# Patient Record
Sex: MALE | Race: WHITE | NOT HISPANIC OR LATINO | URBAN - METROPOLITAN AREA
[De-identification: names, ages, dates, MRNs, and addresses within clinical notes are randomized per-mention and may not be internally consistent; named-entity substitution may affect disease eponyms.]

---

## 2018-01-29 ENCOUNTER — OFFICE (OUTPATIENT)
Dept: URBAN - METROPOLITAN AREA CLINIC 75 | Facility: CLINIC | Age: 58
End: 2018-01-29

## 2018-01-29 VITALS
HEART RATE: 69 BPM | HEIGHT: 70 IN | DIASTOLIC BLOOD PRESSURE: 64 MMHG | SYSTOLIC BLOOD PRESSURE: 118 MMHG | WEIGHT: 234 LBS

## 2018-01-29 DIAGNOSIS — E11.9 TYPE 2 DIABETES MELLITUS WITHOUT COMPLICATIONS: ICD-10-CM

## 2018-01-29 DIAGNOSIS — K57.92 DIVERTICULITIS OF INTESTINE, PART UNSPECIFIED, WITHOUT PERFO: ICD-10-CM

## 2018-01-29 DIAGNOSIS — Z86.010 PERSONAL HISTORY OF COLONIC POLYPS: ICD-10-CM

## 2018-01-29 DIAGNOSIS — R10.13 EPIGASTRIC PAIN: ICD-10-CM

## 2018-01-29 DIAGNOSIS — K85.90 ACUTE PANCREATITIS WITHOUT NECROSIS OR INFECTION, UNSPECIFIE: ICD-10-CM

## 2018-01-29 PROCEDURE — 99244 OFF/OP CNSLTJ NEW/EST MOD 40: CPT | Performed by: INTERNAL MEDICINE

## 2018-01-29 RX ORDER — PANTOPRAZOLE 40 MG/1
TABLET, DELAYED RELEASE ORAL
Qty: 30 | Refills: 3 | Status: ACTIVE

## 2018-02-06 ENCOUNTER — ON CAMPUS - OUTPATIENT (OUTPATIENT)
Dept: URBAN - METROPOLITAN AREA HOSPITAL 108 | Facility: HOSPITAL | Age: 58
End: 2018-02-06
Payer: COMMERCIAL

## 2018-02-06 DIAGNOSIS — K85.90 ACUTE PANCREATITIS WITHOUT NECROSIS OR INFECTION, UNSPECIFIE: ICD-10-CM

## 2018-02-06 DIAGNOSIS — K86.89 OTHER SPECIFIED DISEASES OF PANCREAS: ICD-10-CM

## 2018-02-06 DIAGNOSIS — K29.70 GASTRITIS, UNSPECIFIED, WITHOUT BLEEDING: ICD-10-CM

## 2018-02-06 PROCEDURE — 43239 EGD BIOPSY SINGLE/MULTIPLE: CPT

## 2018-02-06 PROCEDURE — 43259 EGD US EXAM DUODENUM/JEJUNUM: CPT | Mod: 59

## 2020-02-25 PROCEDURE — 82570 ASSAY OF URINE CREATININE: CPT | Performed by: CLINICAL MEDICAL LABORATORY

## 2020-02-25 PROCEDURE — 82043 UR ALBUMIN QUANTITATIVE: CPT | Performed by: CLINICAL MEDICAL LABORATORY

## 2020-02-25 PROCEDURE — PSEU8567 MICROALBUMIN URINE RANDOM: Performed by: CLINICAL MEDICAL LABORATORY

## 2020-02-26 ENCOUNTER — LAB REQUISITION (OUTPATIENT)
Dept: LAB | Age: 60
End: 2020-02-26

## 2020-02-26 DIAGNOSIS — E11.9 TYPE 2 DIABETES MELLITUS WITHOUT COMPLICATIONS (CMD): ICD-10-CM

## 2020-02-26 LAB
CREAT UR-MCNC: 107 MG/DL
MICROALBUMIN UR-MCNC: 0.67 MCG/MIN
MICROALBUMIN/CREAT UR: 6.3 MG/G

## 2021-04-22 PROCEDURE — 82570 ASSAY OF URINE CREATININE: CPT | Performed by: CLINICAL MEDICAL LABORATORY

## 2021-04-22 PROCEDURE — PSEU8567 MICROALBUMIN URINE RANDOM: Performed by: CLINICAL MEDICAL LABORATORY

## 2021-04-22 PROCEDURE — 82043 UR ALBUMIN QUANTITATIVE: CPT | Performed by: CLINICAL MEDICAL LABORATORY

## 2021-04-23 ENCOUNTER — LAB REQUISITION (OUTPATIENT)
Dept: LAB | Age: 61
End: 2021-04-23

## 2021-04-23 DIAGNOSIS — E11.9 TYPE 2 DIABETES MELLITUS WITHOUT COMPLICATIONS (CMD): ICD-10-CM

## 2021-04-23 LAB
CREAT UR-MCNC: 107 MG/DL
MICROALBUMIN UR-MCNC: 0.97 MG/DL
MICROALBUMIN/CREAT UR: 9.1 MG/G

## 2021-05-20 ENCOUNTER — OFFICE VISIT (OUTPATIENT)
Dept: ORTHOPEDIC SURGERY | Facility: CLINIC | Age: 61
End: 2021-05-20

## 2021-05-20 VITALS — BODY MASS INDEX: 33.04 KG/M2 | WEIGHT: 230.8 LBS | TEMPERATURE: 97.5 F | HEIGHT: 70 IN

## 2021-05-20 DIAGNOSIS — M25.511 RIGHT SHOULDER PAIN, UNSPECIFIED CHRONICITY: Primary | ICD-10-CM

## 2021-05-20 DIAGNOSIS — S83.511A RUPTURE OF ANTERIOR CRUCIATE LIGAMENT OF RIGHT KNEE, INITIAL ENCOUNTER: ICD-10-CM

## 2021-05-20 PROCEDURE — 99203 OFFICE O/P NEW LOW 30 MIN: CPT | Performed by: ORTHOPAEDIC SURGERY

## 2021-05-20 RX ORDER — PANTOPRAZOLE SODIUM 40 MG/1
TABLET, DELAYED RELEASE ORAL
COMMUNITY
Start: 2021-05-18

## 2021-05-20 RX ORDER — LOSARTAN POTASSIUM 50 MG/1
TABLET ORAL
COMMUNITY
Start: 2021-05-18

## 2021-05-20 RX ORDER — GLYBURIDE 5 MG/1
5 TABLET ORAL
COMMUNITY

## 2021-05-20 RX ORDER — METOPROLOL SUCCINATE 50 MG/1
TABLET, EXTENDED RELEASE ORAL
COMMUNITY
Start: 2021-05-18

## 2021-05-25 ENCOUNTER — TELEPHONE (OUTPATIENT)
Dept: ORTHOPEDIC SURGERY | Facility: CLINIC | Age: 61
End: 2021-05-25

## 2021-05-25 NOTE — PROGRESS NOTES
New Shoulder      Patient: Gary Canchola        YOB: 1960    Medical Record Number: [id number]        Chief Complaints: Right knee pain 61-year-old male who presents complaining of right knee pain he saw Dr. Gore years ago for his left knee pain he states his right knee gave way 2 days ago when he was changing his close he stepped his knee buckled and gave way.  Symptoms are severe they are 8 out of 10 shooting aching continue giving way work but worse with standing walking better with rest past medical history is remote for diabetes reflux pancreatitis and sleep apnea      History of Present Illness: This is a        Allergies: No Known Allergies    Medications:   Home Medications:  Current Outpatient Medications on File Prior to Visit   Medication Sig   • glyburide (DIAbeta) 5 MG tablet Take 5 mg by mouth.   • losartan (COZAAR) 50 MG tablet    • metFORMIN (GLUCOPHAGE) 500 MG tablet    • metoprolol succinate XL (TOPROL-XL) 50 MG 24 hr tablet    • pantoprazole (PROTONIX) 40 MG EC tablet      No current facility-administered medications on file prior to visit.     Current Medications:  Scheduled Meds:  Continuous Infusions:No current facility-administered medications for this visit.    PRN Meds:.    Past Medical History:   Diagnosis Date   • Diabetes (CMS/HCC)    • GERD (gastroesophageal reflux disease)    • Pancreatitis    • Sleep apnea         Past Surgical History:   Procedure Laterality Date   • ELBOW PROCEDURE  2014,2015        Social History     Occupational History   • Not on file   Tobacco Use   • Smoking status: Never Smoker   • Smokeless tobacco: Never Used   Vaping Use   • Vaping Use: Never used   Substance and Sexual Activity   • Alcohol use: Never   • Drug use: Defer   • Sexual activity: Defer      Social History     Social History Narrative   • Not on file        Family History   Problem Relation Age of Onset   • Heart disease Mother    • Cancer Father              Review of  "Systems: 14 point review of systems are remarkable for pertinent positives listed in the chart by the patient the remainder negative per the patient    Review of Systems      Physical Exam: 61 y.o. male  General Appearance:    Alert, cooperative, in no acute distress                   Vitals:    05/20/21 1426   Temp: 97.5 °F (36.4 °C)   Weight: 105 kg (230 lb 12.8 oz)   Height: 177.8 cm (70\")   PainSc:   8      Patient is alert and read ×3 no acute distress appears her above-listed at height weight and age.  Affect is normal respiratory rate is normal unlabored. Heart rate regular rate rhythm, sclera, dentition and hearing are normal for the purpose of this exam.    Ortho Exam physical exam of the right knee he has no overlying skin changes no lymphedema lymphadenopathy they have no effusion is full range of motion they have some mild tenderness laterally no tenderness medially they have pain with bounce home some pain with Kathe he seems to have has a positive Lockman.  I have a questional bowl endpoint I do not get a pivot shift however they have a lot of hamstring guarding.  Quads are good calf is soft and nontender there is no overlying skin changes, s good hip range of motion and normal ankle exam        Procedures          Radiology:   AP, lateral and oblique view of the right knee were brought with him on CD I did review these he does have some patellofemoral arthritis otherwise seems to have good maintenance of his joint space  Imaging Results (Most Recent)     Procedure Component Value Units Date/Time    SCANNED - IMAGING [[id number]] Resulted: 05/20/21     Updated: 05/25/21 1420        Assessment/Plan: Right knee pain I would be concerned about his ACL also about meniscal pathology he is miserable plan is to proceed with an MRI and have him follow-up after that test  "

## 2021-06-01 ENCOUNTER — TELEPHONE (OUTPATIENT)
Dept: ORTHOPEDIC SURGERY | Facility: CLINIC | Age: 61
End: 2021-06-01

## 2021-06-01 NOTE — TELEPHONE ENCOUNTER
I am sure that Dr. Balderrama will reach out to him today or tomorrow.  Please let him know that we have all been very busy because of the holiday.

## 2021-06-01 NOTE — TELEPHONE ENCOUNTER
PATIENT IS CALLING FOR MRI RESULTS PATIENTS WIFE STATED IT HAS BEEN ALMOST 2 WEEKS AND STILL HASN'T RECEIVED ANY RESULTS AND IS WANTING TO SWITCH DRS TO [facility] WHO HE HAS SEEN IN THE PAST PLEASE GIVE PATIENT A CALL

## 2021-06-01 NOTE — TELEPHONE ENCOUNTER
I just left him a message I have not seen this message previously am not sure what happened but I just left him a message

## 2021-06-01 NOTE — TELEPHONE ENCOUNTER
I EXPLAINED TO PATIENT THAT EVERYONE HAD BEEN BUSY WITH IT BEING THE HOLIDAY AND PEOPLE OUT ON VACATION HIS WIFE WANTED TO SWITH TO YOU SHE STATED THEY NEVER HAD ANY OF THESE ISSUS WHEN YOU WAS HIS DR IN THE PAST

## 2021-06-02 NOTE — TELEPHONE ENCOUNTER
I spoke with this patient at length yesterday answered all his questions he is doing better now he will ride the wave if his symptoms change she will give me a call I might start with an injection if his symptoms return

## 2021-06-07 ENCOUNTER — OFFICE VISIT (OUTPATIENT)
Dept: ORTHOPEDIC SURGERY | Facility: CLINIC | Age: 61
End: 2021-06-07

## 2021-06-07 VITALS — HEIGHT: 70 IN | TEMPERATURE: 98.2 F | WEIGHT: 225 LBS | BODY MASS INDEX: 32.21 KG/M2

## 2021-06-07 DIAGNOSIS — S83.241D ACUTE MEDIAL MENISCUS TEAR OF RIGHT KNEE, SUBSEQUENT ENCOUNTER: Primary | ICD-10-CM

## 2021-06-07 PROCEDURE — 99213 OFFICE O/P EST LOW 20 MIN: CPT | Performed by: ORTHOPAEDIC SURGERY

## 2021-06-07 PROCEDURE — 20610 DRAIN/INJ JOINT/BURSA W/O US: CPT | Performed by: ORTHOPAEDIC SURGERY

## 2021-06-07 RX ORDER — IBUPROFEN 800 MG/1
TABLET ORAL
COMMUNITY
Start: 2021-05-19

## 2021-06-07 RX ORDER — HYDROCHLOROTHIAZIDE 12.5 MG/1
TABLET ORAL
COMMUNITY
Start: 2021-05-18

## 2021-06-07 RX ADMIN — METHYLPREDNISOLONE ACETATE 80 MG: 80 INJECTION, SUSPENSION INTRA-ARTICULAR; INTRALESIONAL; INTRAMUSCULAR; SOFT TISSUE at 15:53

## 2021-06-07 NOTE — PROGRESS NOTES
"Right Knee MRI follow Up      Patient: Gary Canchola        YOB: 1960            Chief Complaints: Right knee pain      History of Present Illness: Patient is here follow-up right knee MRI MRI demonstrates a complex tear of the medial meniscus does have some moderate patellofemoral arthritis and mild medial compartment arthritis he states that this point he does have some night pain but does pretty well during the day wants to know what his conservative options are      Physical Exam: 61 y.o. male  General Appearance:    Alert, cooperative, in no acute distress                   Vitals:    06/07/21 1536   Temp: 98.2 °F (36.8 °C)   TempSrc: Temporal   Weight: 102 kg (225 lb)   Height: 177.8 cm (70\")        Patient is alert and read ×3 no acute distress appears her above-listed at height weight and age.  Affect is normal respiratory rate is normal unlabored. Heart rate regular rate rhythm, sclera, dentition and hearing are normal for the purpose of this exam.      Ortho Exam   Physical exam of the right  knee reveals no effusion no redness.  The patient does have tenderness about the medial l joint line.  No tenderness about the lateral l joint line.  A negative bounce home and a positive l medial Kathe.    Patient has a stable ligamentous exam.  The patient has a negative Lachman and negative anterior drawer and a negative pivot shift.  Quads are reasonable and symmetric bilaterally.  Calf is soft and nontender.  There is no overlying skin changes no lymphedema lymphadenopathy.  Patient has good hip range of motion full symmetric and asymptomatic and a normal ankle exam.  She has good distal pulses and sensation distally is intact          MRIs as above have reviewed the films myself and agree with the findings        Assessment/Plan: Right knee medial meniscus tear with some degenerative changes he is doing pretty well overall he wants to know what his other conservative options are think it is " reasonable to proceed with an injection as a diagnostic and therapeutic tool this would address his arthritis.  He still has symptoms beyond this that are activity limiting I would consider arthroscopy to address the meniscus      Large Joint Arthrocentesis: R knee  Date/Time: 6/7/2021 3:53 PM  Consent given by: patient  Site marked: site marked  Timeout: Immediately prior to procedure a time out was called to verify the correct patient, procedure, equipment, support staff and site/side marked as required   Supporting Documentation  Indications: pain and joint swelling   Procedure Details  Location: knee - R knee  Preparation: Patient was prepped and draped in the usual sterile fashion  Needle size: 22 G  Approach: anterolateral  Medications administered: 80 mg methylPREDNISolone acetate 80 MG/ML; 4 mL lidocaine (cardiac)  Patient tolerance: patient tolerated the procedure well with no immediate complications

## 2021-06-08 RX ORDER — METHYLPREDNISOLONE ACETATE 80 MG/ML
80 INJECTION, SUSPENSION INTRA-ARTICULAR; INTRALESIONAL; INTRAMUSCULAR; SOFT TISSUE
Status: COMPLETED | OUTPATIENT
Start: 2021-06-07 | End: 2021-06-07

## 2022-03-23 ENCOUNTER — TELEPHONE (OUTPATIENT)
Dept: CARDIAC REHAB | Facility: HOSPITAL | Age: 62
End: 2022-03-23

## 2022-04-06 ENCOUNTER — LAB REQUISITION (OUTPATIENT)
Dept: LAB | Age: 62
End: 2022-04-06

## 2022-04-06 DIAGNOSIS — E11.9 TYPE 2 DIABETES MELLITUS WITHOUT COMPLICATIONS (CMD): ICD-10-CM

## 2022-04-06 PROCEDURE — 82570 ASSAY OF URINE CREATININE: CPT | Performed by: CLINICAL MEDICAL LABORATORY

## 2022-04-06 PROCEDURE — 82043 UR ALBUMIN QUANTITATIVE: CPT | Performed by: CLINICAL MEDICAL LABORATORY

## 2022-04-06 PROCEDURE — 83036 HEMOGLOBIN GLYCOSYLATED A1C: CPT | Performed by: CLINICAL MEDICAL LABORATORY

## 2022-04-06 PROCEDURE — PSEU8266 GLYCOHEMOGLOBIN: Performed by: CLINICAL MEDICAL LABORATORY

## 2022-04-06 PROCEDURE — PSEU8567 MICROALBUMIN URINE RANDOM: Performed by: CLINICAL MEDICAL LABORATORY

## 2022-04-07 LAB
CREAT UR-MCNC: 68.5 MG/DL
HBA1C MFR BLD: 7.6 % (ref 4.5–5.6)
MICROALBUMIN UR-MCNC: 0.91 MG/DL
MICROALBUMIN/CREAT UR: 13.3 MG/G

## 2022-12-26 ENCOUNTER — LAB REQUISITION (OUTPATIENT)
Dept: LAB | Age: 62
End: 2022-12-26

## 2022-12-26 DIAGNOSIS — N30.01 ACUTE CYSTITIS WITH HEMATURIA: ICD-10-CM

## 2022-12-26 PROCEDURE — 87086 URINE CULTURE/COLONY COUNT: CPT | Performed by: CLINICAL MEDICAL LABORATORY

## 2022-12-26 PROCEDURE — PSEU9005 URINE, BACTERIAL CULTURE: Performed by: CLINICAL MEDICAL LABORATORY

## 2022-12-28 LAB — BACTERIA UR CULT: NORMAL

## 2023-03-23 ENCOUNTER — LAB REQUISITION (OUTPATIENT)
Dept: LAB | Age: 63
End: 2023-03-23

## 2023-03-23 DIAGNOSIS — E11.9 TYPE 2 DIABETES MELLITUS WITHOUT COMPLICATIONS (CMD): ICD-10-CM

## 2023-03-23 LAB — HBA1C MFR BLD: 8.3 % (ref 4.5–5.6)

## 2023-03-23 PROCEDURE — 83036 HEMOGLOBIN GLYCOSYLATED A1C: CPT | Performed by: CLINICAL MEDICAL LABORATORY

## 2023-03-23 PROCEDURE — PSEU8266 GLYCOHEMOGLOBIN: Performed by: CLINICAL MEDICAL LABORATORY

## 2023-05-15 ENCOUNTER — LAB REQUISITION (OUTPATIENT)
Dept: LAB | Age: 63
End: 2023-05-15

## 2023-05-15 DIAGNOSIS — R53.83 OTHER FATIGUE: ICD-10-CM

## 2023-05-15 DIAGNOSIS — E11.9 TYPE 2 DIABETES MELLITUS WITHOUT COMPLICATIONS (CMD): ICD-10-CM

## 2023-05-15 PROCEDURE — 82607 VITAMIN B-12: CPT | Performed by: CLINICAL MEDICAL LABORATORY

## 2023-05-15 PROCEDURE — 82570 ASSAY OF URINE CREATININE: CPT | Performed by: CLINICAL MEDICAL LABORATORY

## 2023-05-15 PROCEDURE — PSEU8306 VITAMIN B12: Performed by: CLINICAL MEDICAL LABORATORY

## 2023-05-15 PROCEDURE — PSEU8567 MICROALBUMIN URINE RANDOM: Performed by: CLINICAL MEDICAL LABORATORY

## 2023-05-15 PROCEDURE — 82043 UR ALBUMIN QUANTITATIVE: CPT | Performed by: CLINICAL MEDICAL LABORATORY

## 2023-05-16 LAB
CREAT UR-MCNC: 36.6 MG/DL
MICROALBUMIN UR-MCNC: 0.54 MG/DL
MICROALBUMIN/CREAT UR: 14.8 MG/G
VIT B12 SERPL-MCNC: 313 PG/ML (ref 211–911)

## 2023-09-21 ENCOUNTER — LAB REQUISITION (OUTPATIENT)
Dept: LAB | Age: 63
End: 2023-09-21

## 2023-09-21 DIAGNOSIS — E11.40 TYPE 2 DIABETES MELLITUS WITH DIABETIC NEUROPATHY, UNSPECIFIED  (CMD): ICD-10-CM

## 2023-09-21 PROCEDURE — 83036 HEMOGLOBIN GLYCOSYLATED A1C: CPT | Performed by: CLINICAL MEDICAL LABORATORY

## 2023-09-21 PROCEDURE — PSEU8266 GLYCOHEMOGLOBIN: Performed by: CLINICAL MEDICAL LABORATORY

## 2023-09-22 LAB — HBA1C MFR BLD: 10.8 % (ref 4.5–5.6)

## 2023-12-11 ENCOUNTER — LAB REQUISITION (OUTPATIENT)
Dept: LAB | Age: 63
End: 2023-12-11

## 2023-12-11 DIAGNOSIS — E11.40 TYPE 2 DIABETES MELLITUS WITH DIABETIC NEUROPATHY, UNSPECIFIED  (CMD): ICD-10-CM

## 2023-12-11 LAB — HBA1C MFR BLD: 9.4 % (ref 4.5–5.6)

## 2023-12-11 PROCEDURE — PSEU8266 GLYCOHEMOGLOBIN: Performed by: CLINICAL MEDICAL LABORATORY

## 2023-12-11 PROCEDURE — 83036 HEMOGLOBIN GLYCOSYLATED A1C: CPT | Performed by: CLINICAL MEDICAL LABORATORY

## 2024-03-14 ENCOUNTER — LAB REQUISITION (OUTPATIENT)
Dept: LAB | Age: 64
End: 2024-03-14

## 2024-03-14 DIAGNOSIS — E11.40 TYPE 2 DIABETES MELLITUS WITH DIABETIC NEUROPATHY, UNSPECIFIED  (CMD): ICD-10-CM

## 2024-03-14 LAB — HBA1C MFR BLD: 9 % (ref 4.5–5.6)

## 2024-03-14 PROCEDURE — 83036 HEMOGLOBIN GLYCOSYLATED A1C: CPT | Performed by: CLINICAL MEDICAL LABORATORY

## 2024-03-14 PROCEDURE — PSEU8266 GLYCOHEMOGLOBIN: Performed by: CLINICAL MEDICAL LABORATORY

## 2024-06-18 ENCOUNTER — LAB REQUISITION (OUTPATIENT)
Dept: LAB | Age: 64
End: 2024-06-18

## 2024-06-18 DIAGNOSIS — E11.40 TYPE 2 DIABETES MELLITUS WITH DIABETIC NEUROPATHY, UNSPECIFIED  (CMD): ICD-10-CM

## 2024-06-18 LAB — HBA1C MFR BLD: 9.3 % (ref 4.5–5.6)

## 2024-06-18 PROCEDURE — 82570 ASSAY OF URINE CREATININE: CPT | Performed by: CLINICAL MEDICAL LABORATORY

## 2024-06-18 PROCEDURE — PSEU8567 MICROALBUMIN URINE RANDOM: Performed by: CLINICAL MEDICAL LABORATORY

## 2024-06-18 PROCEDURE — 82043 UR ALBUMIN QUANTITATIVE: CPT | Performed by: CLINICAL MEDICAL LABORATORY

## 2024-06-18 PROCEDURE — 83036 HEMOGLOBIN GLYCOSYLATED A1C: CPT | Performed by: CLINICAL MEDICAL LABORATORY

## 2024-06-18 PROCEDURE — PSEU8266 GLYCOHEMOGLOBIN: Performed by: CLINICAL MEDICAL LABORATORY

## 2024-06-19 LAB
CREAT UR-MCNC: 226 MG/DL
MICROALBUMIN UR-MCNC: 3.63 MG/DL
MICROALBUMIN/CREAT UR: 16.1 MG/G

## 2024-09-27 ENCOUNTER — OFFICE (OUTPATIENT)
Age: 64
End: 2024-09-27

## 2024-09-27 ENCOUNTER — OFFICE (OUTPATIENT)
Dept: URBAN - METROPOLITAN AREA CLINIC 76 | Facility: CLINIC | Age: 64
End: 2024-09-27

## 2024-09-27 VITALS
HEART RATE: 64 BPM | HEIGHT: 70 IN | SYSTOLIC BLOOD PRESSURE: 140 MMHG | DIASTOLIC BLOOD PRESSURE: 82 MMHG | HEIGHT: 70 IN | WEIGHT: 217 LBS | DIASTOLIC BLOOD PRESSURE: 82 MMHG | WEIGHT: 217 LBS | DIASTOLIC BLOOD PRESSURE: 82 MMHG | HEIGHT: 70 IN | DIASTOLIC BLOOD PRESSURE: 82 MMHG | SYSTOLIC BLOOD PRESSURE: 140 MMHG | HEART RATE: 64 BPM | OXYGEN SATURATION: 98 % | HEIGHT: 70 IN | HEART RATE: 64 BPM | OXYGEN SATURATION: 98 % | OXYGEN SATURATION: 98 % | DIASTOLIC BLOOD PRESSURE: 82 MMHG | HEART RATE: 64 BPM | WEIGHT: 217 LBS | OXYGEN SATURATION: 98 % | WEIGHT: 217 LBS | WEIGHT: 217 LBS | SYSTOLIC BLOOD PRESSURE: 140 MMHG | SYSTOLIC BLOOD PRESSURE: 140 MMHG | HEART RATE: 64 BPM | SYSTOLIC BLOOD PRESSURE: 140 MMHG | HEART RATE: 64 BPM | HEIGHT: 70 IN | WEIGHT: 217 LBS | HEIGHT: 70 IN | OXYGEN SATURATION: 98 % | WEIGHT: 217 LBS | OXYGEN SATURATION: 98 % | DIASTOLIC BLOOD PRESSURE: 82 MMHG | SYSTOLIC BLOOD PRESSURE: 140 MMHG | OXYGEN SATURATION: 98 % | DIASTOLIC BLOOD PRESSURE: 82 MMHG | HEIGHT: 70 IN | HEART RATE: 64 BPM | SYSTOLIC BLOOD PRESSURE: 140 MMHG

## 2024-09-27 DIAGNOSIS — K85.90 ACUTE PANCREATITIS WITHOUT NECROSIS OR INFECTION, UNSPECIFIE: ICD-10-CM

## 2024-09-27 DIAGNOSIS — K21.9 GASTRO-ESOPHAGEAL REFLUX DISEASE WITHOUT ESOPHAGITIS: ICD-10-CM

## 2024-09-27 PROCEDURE — 99204 OFFICE O/P NEW MOD 45 MIN: CPT

## 2024-09-27 RX ORDER — PANTOPRAZOLE 40 MG/1
TABLET, DELAYED RELEASE ORAL
Qty: 90 | Refills: 3 | Status: ACTIVE

## 2025-06-10 ENCOUNTER — LAB REQUISITION (OUTPATIENT)
Dept: LAB | Age: 65
End: 2025-06-10

## 2025-06-10 DIAGNOSIS — Z79.4 LONG TERM (CURRENT) USE OF INSULIN  (CMD): ICD-10-CM

## 2025-06-10 DIAGNOSIS — E11.40 TYPE 2 DIABETES MELLITUS WITH DIABETIC NEUROPATHY, UNSPECIFIED  (CMD): ICD-10-CM

## 2025-06-10 PROCEDURE — 82043 UR ALBUMIN QUANTITATIVE: CPT | Performed by: CLINICAL MEDICAL LABORATORY

## 2025-06-10 PROCEDURE — PSEU8567 MICROALBUMIN URINE RANDOM: Performed by: CLINICAL MEDICAL LABORATORY

## 2025-06-10 PROCEDURE — 82570 ASSAY OF URINE CREATININE: CPT | Performed by: CLINICAL MEDICAL LABORATORY

## 2025-06-11 LAB
CREAT UR-MCNC: 127 MG/DL
MICROALBUMIN UR-MCNC: 2.66 MG/DL
MICROALBUMIN/CREAT UR: 20.9 MG/G